# Patient Record
Sex: FEMALE | Race: BLACK OR AFRICAN AMERICAN | NOT HISPANIC OR LATINO | Employment: OTHER | URBAN - METROPOLITAN AREA
[De-identification: names, ages, dates, MRNs, and addresses within clinical notes are randomized per-mention and may not be internally consistent; named-entity substitution may affect disease eponyms.]

---

## 2021-04-26 ENCOUNTER — OFFICE VISIT (OUTPATIENT)
Dept: URBAN - METROPOLITAN AREA CLINIC 27 | Facility: CLINIC | Age: 76
End: 2021-04-26

## 2021-05-25 ENCOUNTER — OFFICE VISIT (OUTPATIENT)
Dept: URBAN - METROPOLITAN AREA SURGERY CENTER 7 | Facility: SURGERY CENTER | Age: 76
End: 2021-05-25

## 2021-06-22 ENCOUNTER — OFFICE VISIT (OUTPATIENT)
Dept: URBAN - METROPOLITAN AREA CLINIC 27 | Facility: CLINIC | Age: 76
End: 2021-06-22

## 2021-06-22 PROBLEM — 68496003 POLYP OF COLON: Status: ACTIVE | Noted: 2021-06-22

## 2022-04-30 ENCOUNTER — TELEPHONE ENCOUNTER (OUTPATIENT)
Dept: URBAN - METROPOLITAN AREA CLINIC 121 | Facility: CLINIC | Age: 77
End: 2022-04-30

## 2022-04-30 RX ORDER — HYDRALAZINE HYDROCHLORIDE 25 MG/1
QD TABLET ORAL
OUTPATIENT
Start: 2011-02-22

## 2022-04-30 RX ORDER — HYDRALAZINE HYDROCHLORIDE 25 MG/1
QD TABLET ORAL
OUTPATIENT
Start: 2011-02-22 | End: 2021-04-26

## 2022-04-30 RX ORDER — DILTIAZEM HYDROCHLORIDE EXTENDED-RELEASE TABLETS 360 MG/1
QD TABLET, EXTENDED RELEASE ORAL
OUTPATIENT
Start: 2011-02-22 | End: 2021-04-26

## 2022-04-30 RX ORDER — METOPROLOL TARTRATE 50 MG/1
QD TABLET, FILM COATED ORAL
OUTPATIENT
Start: 2011-02-22 | End: 2021-04-26

## 2022-04-30 RX ORDER — ERGOCALCIFEROL 1.25 MG/1
CAPSULE, LIQUID FILLED ORAL
OUTPATIENT
Start: 2011-02-22 | End: 2011-03-04

## 2022-04-30 RX ORDER — MOMETASONE FUROATE 1 MG/G
CREAM TOPICAL
OUTPATIENT
Start: 2011-02-22 | End: 2021-04-26

## 2022-04-30 RX ORDER — METOPROLOL TARTRATE 50 MG/1
QD TABLET, FILM COATED ORAL
OUTPATIENT
Start: 2011-02-22

## 2022-04-30 RX ORDER — ERGOCALCIFEROL 1.25 MG/1
CAPSULE, LIQUID FILLED ORAL
OUTPATIENT
Start: 2011-02-22

## 2022-04-30 RX ORDER — MOMETASONE FUROATE 1 MG/G
CREAM TOPICAL
OUTPATIENT
Start: 2011-02-22

## 2022-04-30 RX ORDER — DILTIAZEM HYDROCHLORIDE EXTENDED-RELEASE TABLETS 360 MG/1
QD TABLET, EXTENDED RELEASE ORAL
OUTPATIENT
Start: 2011-02-22

## 2022-05-01 ENCOUNTER — TELEPHONE ENCOUNTER (OUTPATIENT)
Dept: URBAN - METROPOLITAN AREA CLINIC 121 | Facility: CLINIC | Age: 77
End: 2022-05-01

## 2022-05-01 RX ORDER — SIMVASTATIN 40 MG/1
QD TABLET, FILM COATED ORAL
Status: ACTIVE | COMMUNITY
Start: 2011-02-22

## 2022-05-01 RX ORDER — HYDROCHLOROTHIAZIDE 25 MG/1
TABLET ORAL
Status: ACTIVE | COMMUNITY
Start: 2021-04-26

## 2022-05-01 RX ORDER — MULTIVIT-MIN/FOLIC/VIT K/LYCOP 400-300MCG
TABLET ORAL
Status: ACTIVE | COMMUNITY
Start: 2011-02-22

## 2022-05-01 RX ORDER — ASPIRIN 81 MG/1
TABLET ORAL
Status: ACTIVE | COMMUNITY
Start: 2011-02-22

## 2022-05-01 RX ORDER — LATANOPROST/PF 0.005 %
DROPS OPHTHALMIC (EYE)
Status: ACTIVE | COMMUNITY
Start: 2021-04-26

## 2022-05-01 RX ORDER — LEVOTHYROXINE SODIUM 50 UG/1
TABLET ORAL
Status: ACTIVE | COMMUNITY
Start: 2021-04-26

## 2023-11-07 ENCOUNTER — OFFICE VISIT (OUTPATIENT)
Dept: URBAN - METROPOLITAN AREA CLINIC 27 | Facility: CLINIC | Age: 78
End: 2023-11-07
Payer: MEDICARE

## 2023-11-07 ENCOUNTER — DASHBOARD ENCOUNTERS (OUTPATIENT)
Age: 78
End: 2023-11-07

## 2023-11-07 VITALS
BODY MASS INDEX: 17.85 KG/M2 | HEART RATE: 78 BPM | RESPIRATION RATE: 17 BRPM | HEIGHT: 62 IN | DIASTOLIC BLOOD PRESSURE: 89 MMHG | SYSTOLIC BLOOD PRESSURE: 163 MMHG | WEIGHT: 97 LBS

## 2023-11-07 DIAGNOSIS — Z79.82 LONG TERM (CURRENT) USE OF ASPIRIN: ICD-10-CM

## 2023-11-07 DIAGNOSIS — Z86.010 PERSONAL HISTORY OF COLONIC POLYPS: ICD-10-CM

## 2023-11-07 PROBLEM — 449681000124101 LONG-TERM CURRENT USE OF ANTIPLATELET DRUG: Status: ACTIVE | Noted: 2021-04-26

## 2023-11-07 PROBLEM — 428283002: Status: ACTIVE | Noted: 2023-11-07

## 2023-11-07 PROCEDURE — 99214 OFFICE O/P EST MOD 30 MIN: CPT | Performed by: PHYSICIAN ASSISTANT

## 2023-11-07 RX ORDER — MULTIVIT-MIN/FOLIC/VIT K/LYCOP 400-300MCG
TABLET ORAL
Status: ACTIVE | COMMUNITY
Start: 2011-02-22

## 2023-11-07 RX ORDER — ASPIRIN 81 MG/1
TABLET ORAL
Status: ACTIVE | COMMUNITY
Start: 2011-02-22

## 2023-11-07 RX ORDER — HYDROCHLOROTHIAZIDE 25 MG/1
TABLET ORAL
Status: DISCONTINUED | COMMUNITY
Start: 2021-04-26

## 2023-11-07 RX ORDER — SIMVASTATIN 40 MG/1
QD TABLET, FILM COATED ORAL
Status: ACTIVE | COMMUNITY
Start: 2011-02-22

## 2023-11-07 RX ORDER — LEVOTHYROXINE SODIUM 50 UG/1
TABLET ORAL
Status: ACTIVE | COMMUNITY
Start: 2021-04-26

## 2023-11-07 RX ORDER — LATANOPROST/PF 0.005 %
DROPS OPHTHALMIC (EYE)
Status: ACTIVE | COMMUNITY
Start: 2021-04-26

## 2024-01-11 ENCOUNTER — OFFICE VISIT (OUTPATIENT)
Dept: URBAN - METROPOLITAN AREA SURGERY CENTER 7 | Facility: SURGERY CENTER | Age: 79
End: 2024-01-11
Payer: MEDICARE

## 2024-01-11 ENCOUNTER — CLAIMS CREATED FROM THE CLAIM WINDOW (OUTPATIENT)
Dept: URBAN - METROPOLITAN AREA CLINIC 4 | Facility: CLINIC | Age: 79
End: 2024-01-11
Payer: MEDICARE

## 2024-01-11 DIAGNOSIS — K63.5 BENIGN COLON POLYP: ICD-10-CM

## 2024-01-11 DIAGNOSIS — K63.89 OTHER SPECIFIED DISEASES OF INTESTINE: ICD-10-CM

## 2024-01-11 DIAGNOSIS — K57.30 DIVERTICULOSIS OF SIGMOID COLON: ICD-10-CM

## 2024-01-11 DIAGNOSIS — Z86.010 PERSONAL HISTORY OF COLON POLYPS: ICD-10-CM

## 2024-01-11 DIAGNOSIS — Z86.010 ADENOMAS PERSONAL HISTORY OF COLONIC POLYPS: ICD-10-CM

## 2024-01-11 DIAGNOSIS — D12.5 ADENOMATOUS POLYP OF SIGMOID COLON: ICD-10-CM

## 2024-01-11 DIAGNOSIS — Z09 ENCNTR FOR F/U EXAM AFT TRTMT FOR COND OTH THAN MALIG NEOPLM: ICD-10-CM

## 2024-01-11 PROCEDURE — 45385 COLONOSCOPY W/LESION REMOVAL: CPT | Performed by: CLINIC/CENTER

## 2024-01-11 PROCEDURE — 88305 TISSUE EXAM BY PATHOLOGIST: CPT | Performed by: PATHOLOGY

## 2024-01-11 PROCEDURE — G8907 PT DOC NO EVENTS ON DISCHARG: HCPCS | Performed by: CLINIC/CENTER

## 2024-01-11 PROCEDURE — 00811 ANES LWR INTST NDSC NOS: CPT | Performed by: NURSE ANESTHETIST, CERTIFIED REGISTERED

## 2024-01-11 PROCEDURE — 45385 COLONOSCOPY W/LESION REMOVAL: CPT | Performed by: INTERNAL MEDICINE

## 2024-01-11 RX ORDER — ASPIRIN 81 MG/1
TABLET ORAL
Status: ACTIVE | COMMUNITY
Start: 2011-02-22

## 2024-01-11 RX ORDER — MULTIVIT-MIN/FOLIC/VIT K/LYCOP 400-300MCG
TABLET ORAL
Status: ACTIVE | COMMUNITY
Start: 2011-02-22

## 2024-01-11 RX ORDER — LATANOPROST/PF 0.005 %
DROPS OPHTHALMIC (EYE)
Status: ACTIVE | COMMUNITY
Start: 2021-04-26

## 2024-01-11 RX ORDER — LEVOTHYROXINE SODIUM 50 UG/1
TABLET ORAL
Status: ACTIVE | COMMUNITY
Start: 2021-04-26

## 2024-01-11 RX ORDER — SIMVASTATIN 40 MG/1
QD TABLET, FILM COATED ORAL
Status: ACTIVE | COMMUNITY
Start: 2011-02-22

## 2025-02-04 ENCOUNTER — LAB OUTSIDE AN ENCOUNTER (OUTPATIENT)
Dept: URBAN - METROPOLITAN AREA CLINIC 27 | Facility: CLINIC | Age: 80
End: 2025-02-04

## 2025-02-04 ENCOUNTER — OFFICE VISIT (OUTPATIENT)
Dept: URBAN - METROPOLITAN AREA CLINIC 27 | Facility: CLINIC | Age: 80
End: 2025-02-04
Payer: MEDICARE

## 2025-02-04 VITALS
SYSTOLIC BLOOD PRESSURE: 147 MMHG | DIASTOLIC BLOOD PRESSURE: 87 MMHG | HEART RATE: 78 BPM | HEIGHT: 62 IN | BODY MASS INDEX: 18.22 KG/M2 | WEIGHT: 99 LBS

## 2025-02-04 DIAGNOSIS — Z79.82 LONG TERM (CURRENT) USE OF ASPIRIN: ICD-10-CM

## 2025-02-04 DIAGNOSIS — I10 ESSENTIAL (PRIMARY) HYPERTENSION: ICD-10-CM

## 2025-02-04 DIAGNOSIS — K63.5 POLYP OF COLON: ICD-10-CM

## 2025-02-04 DIAGNOSIS — E03.9 HYPOTHYROIDISM: ICD-10-CM

## 2025-02-04 PROCEDURE — 99214 OFFICE O/P EST MOD 30 MIN: CPT | Performed by: INTERNAL MEDICINE

## 2025-02-04 RX ORDER — LEVOTHYROXINE SODIUM 50 UG/1
TABLET ORAL
Status: ACTIVE | COMMUNITY
Start: 2021-04-26

## 2025-02-04 RX ORDER — ASPIRIN 81 MG/1
TABLET ORAL
Status: ACTIVE | COMMUNITY
Start: 2011-02-22

## 2025-02-04 RX ORDER — SIMVASTATIN 40 MG/1
QD TABLET, FILM COATED ORAL
Status: ACTIVE | COMMUNITY
Start: 2011-02-22

## 2025-02-04 RX ORDER — LATANOPROST/PF 0.005 %
DROPS OPHTHALMIC (EYE)
Status: ACTIVE | COMMUNITY
Start: 2021-04-26

## 2025-02-04 RX ORDER — MULTIVIT-MIN/FOLIC/VIT K/LYCOP 400-300MCG
TABLET ORAL
Status: ACTIVE | COMMUNITY
Start: 2011-02-22

## 2025-02-04 NOTE — HPI-TODAY'S VISIT:
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  She underwent ileal cecal resection.  She underwent a subsequent colonoscopy last year with only a fair preparation and has additional polyps.  1 year follow-up was recommended.  She is doing well.  She takes aspirin.  She also takes losartan and levothyroxine.  No GI complaints today.  Her daughter is with her and she is doing well

## 2025-03-11 ENCOUNTER — OFFICE VISIT (OUTPATIENT)
Dept: URBAN - METROPOLITAN AREA SURGERY CENTER 7 | Facility: SURGERY CENTER | Age: 80
End: 2025-03-11

## 2025-03-11 ENCOUNTER — OFFICE VISIT (OUTPATIENT)
Dept: URBAN - METROPOLITAN AREA MEDICAL CENTER 8 | Facility: MEDICAL CENTER | Age: 80
End: 2025-03-11
Payer: MEDICARE

## 2025-03-11 DIAGNOSIS — Z86.0101 H/O ADENOMATOUS POLYP OF COLON: ICD-10-CM

## 2025-03-11 DIAGNOSIS — K63.5 BENIGN COLON POLYP: ICD-10-CM

## 2025-03-11 PROCEDURE — 45385 COLONOSCOPY W/LESION REMOVAL: CPT | Performed by: INTERNAL MEDICINE

## 2025-03-11 RX ORDER — MULTIVIT-MIN/FOLIC/VIT K/LYCOP 400-300MCG
TABLET ORAL
Status: ACTIVE | COMMUNITY
Start: 2011-02-22

## 2025-03-11 RX ORDER — LEVOTHYROXINE SODIUM 50 UG/1
TABLET ORAL
Status: ACTIVE | COMMUNITY
Start: 2021-04-26

## 2025-03-11 RX ORDER — ASPIRIN 81 MG/1
TABLET ORAL
Status: ACTIVE | COMMUNITY
Start: 2011-02-22

## 2025-03-11 RX ORDER — SIMVASTATIN 40 MG/1
QD TABLET, FILM COATED ORAL
Status: ACTIVE | COMMUNITY
Start: 2011-02-22

## 2025-03-11 RX ORDER — LATANOPROST/PF 0.005 %
DROPS OPHTHALMIC (EYE)
Status: ACTIVE | COMMUNITY
Start: 2021-04-26